# Patient Record
Sex: FEMALE | URBAN - METROPOLITAN AREA
[De-identification: names, ages, dates, MRNs, and addresses within clinical notes are randomized per-mention and may not be internally consistent; named-entity substitution may affect disease eponyms.]

---

## 2024-10-03 ENCOUNTER — OFFICE VISIT (OUTPATIENT)
Dept: URGENT CARE | Age: 34
End: 2024-10-03
Payer: MEDICAID

## 2024-10-03 VITALS
WEIGHT: 145 LBS | BODY MASS INDEX: 24.16 KG/M2 | HEART RATE: 75 BPM | HEIGHT: 65 IN | DIASTOLIC BLOOD PRESSURE: 79 MMHG | TEMPERATURE: 97.8 F | OXYGEN SATURATION: 96 % | RESPIRATION RATE: 16 BRPM | SYSTOLIC BLOOD PRESSURE: 109 MMHG

## 2024-10-03 DIAGNOSIS — H69.93 EUSTACHIAN TUBE DYSFUNCTION, BILATERAL: Primary | ICD-10-CM

## 2024-10-03 RX ORDER — CETIRIZINE HYDROCHLORIDE 10 MG/1
10 TABLET ORAL DAILY
COMMUNITY

## 2024-10-03 NOTE — PATIENT INSTRUCTIONS
Increase fluids and rest  Decongestants and nasal spray as discussed  Motrin or Tylenol as needed for pain or fever  Gargle with lightly salted warm water several times a day  Referral placed to ENT

## 2024-10-03 NOTE — PROGRESS NOTES
"Subjective   Patient ID: Jeannie Gaffney is a 34 y.o. female. They present today with a chief complaint of Earache (Left ear, on and off for 1 month).    History of Present Illness  HPI  1 to 2 months of intermittent left-sided ear pain. Mild nasal congestion with postnasal drip. No fevers or chills. No eye redness, discharge or itching.  No blood or discharge noted from ear.  No ear tenderness.  No nausea vomiting or diarrhea. No chest pain, shortness of breath or wheezing noted. No rashes or skin lesions noted. No known exposures to Mono, strep, pneumonia or influenza. Over-the-counter medications taken for symptoms. Nonsmoker.  Patient was seen at another facility where they recommended guaifenesin which she states has not helped.    Past Medical History  Allergies as of 10/03/2024 - Reviewed 10/03/2024   Allergen Reaction Noted    Penicillins Unknown 10/03/2024       (Not in a hospital admission)       No past medical history on file.    No past surgical history on file.     reports that she has never smoked. She has never used smokeless tobacco.    Review of Systems  Review of Systems as in history of present illness                               Objective    Vitals:    10/03/24 1008   BP: 109/79   BP Location: Left arm   Pulse: 75   Resp: 16   Temp: 36.6 °C (97.8 °F)   SpO2: 96%   Weight: 65.8 kg (145 lb)   Height: 1.651 m (5' 5\")     No LMP recorded.    Physical Exam  Gen- A&O. NAD at rest.   Ears- canals and TM's appear unremarkable with no signs of effusion, erythema or swelling  OP-no erythema or exudates. Some mucous in posterior OP   Neck-no anterior lymphadenopathy  Lungs- clear to auscultation without wheezes or rhonchi  Skin-no rashes, hives or lesions  Procedures    Point of Care Test & Imaging Results from this visit  No results found for this visit on 10/03/24.   No results found.    Diagnostic study results (if any) were reviewed by Suraj Hall MD.    Assessment/Plan   Allergies, medications, " history, and pertinent labs/EKGs/Imaging reviewed by Suraj Hall MD.     Medical Decision Making  At time of discharge patient was clinically well-appearing and HDS for outpatient management. The patient and/or family was educated regarding diagnosis, supportive care, OTC and Rx medications. The patient and/or family was given the opportunity to ask questions prior to discharge.  They verbalized understanding of my discussion of the plans for treatment, expected course, indications to return to  or seek further evaluation in ED, and the need for timely follow up as directed.   They were provided with a work/school excuse if requested.    Orders and Diagnoses  Diagnoses and all orders for this visit:  Eustachian tube dysfunction, bilateral      Medical Admin Record      Patient disposition: Home    Electronically signed by Suraj Hall MD  10:23 AM

## 2024-11-11 NOTE — PROGRESS NOTES
Patient ID: Jeannie Gaffney is a 34 y.o. female who presents for the evaluation of bilateral ear fullness, left otalgia, and nasal obstruction. They present as a referral from urgent care provider Dr. Suraj Hall.      PROVIDER IMPRESSIONS:  DIAGNOSES/PROBLEMS:  -Bilateral ear fullness  -TMJ dysfunction/syndrome  -Allergic rhinitis    ASSESSMENT:   Jeannie Gaffney is a pleasant 34 y.o. female who presents with symptoms of bilateral aural fullness (left>right), intermittent left otalgia, rhinorrhea and nasal obstruction. Based on the clinical information provided, symptoms and clinical exam findings are consistent with allergic rhinitis and TMJ dysfunction/syndrome. Reassurance provided to patient that exam today showed no evidence of acute infection or inflammation in the EAC bilaterally and that TM appears with no evidence of infection, effusion, or perforation bilaterally. Anterior rhinoscopy revealed bilateral ITH with pale/boggy appearance to the nasal mucosa and septum anteriorly deviated to the left.  Audiogram reviewed in detail with the patient, which revealed essentially normal hearing and middle ear function results in bilateral ears. I explained to patient that there is no evidence of negative middle ear pressure on tympanogram results and no sign of TM retraction otologic exam today.    PLAN:  I recommended patient initiates use of Flonase Sensimist for allergic rhinitis. I recommended either 2 puffs into each nostril daily, or 1 puff into each nostril twice daily for a consistent trial of at least 4-6 weeks. Patient counseled that this medication is a topical intranasal steroid nasal spray indicated to reduce nasal inflammation. Patient was educated on proper administration of nasal spray, by tilting their head down and pointing the applicator tip towards the lateral wall of the nose/corner of the eye on the same side. I advised patient to avoid pointing applicator toward the septum due to the risk of nasal  bleeding.  Patient informed to discontinue the spray if they experience adverse side effects. This medication may be purchased over the counter; a prescription may was to the patient's pharmacy upon request.  I recommend the patient starts to perform intranasal saline irrigations, either once daily or every other day, for allergic rhinitis. I educated the patient that saline irrigations aid in flushing out residual mucus, crusts, allergens or other environmental irritants in the nasal cavity. I emphasized that this will help clean the nasal cavity PRIOR to use of the medicated intranasal spray. I counseled the patient on appropriate administration of saline irrigations. I informed the patient that saline irrigation kits may be purchased over the counter.   I recommended implementing the following lifestyle strategies for TMJ symptom management: Initiate a soft diet for the next 2-3 weeks and avoid eating chewy/tough foods such as gum, raw fruits/vegetables, tough meats, or anything else that requires significant mastication. Examples of appropriate soft foods include mashed potatoes, soft pasta, macaroni, yogurt, ice cream, and other things may easily be mashed with a fork. Perform temple and cheekbone massages twice daily for several minutes by using your knuckles to gently massage in circles near temples and along your cheekbones as tolerated. Apply a warm/cold compress along the entire side of the affected face, directly over TMJ structures, once or twice daily for 20 minutes at a time and remove sooner if skin irritation occurs. Consider purchase of a custom nighttime mouth guard from a dentist to prevent jaw clenching and/or teeth grinding while asleep. If facial pain is present, may use o.t.c.  ibuprofen 600 mg three times a day for three days only with meals, advised to monitor for side effects of upset stomach and ulcers if ibuprofen is taken on an empty stomach and advised to avoid NSAIDs if previously  "deemed as contraindicated. Patient advised to refrain from habitual wide jaw-opening maneuver in attempt to pop the ears as this may exacerbate TMD.  Follow-up: Patient may schedule for follow-up in 3 months to evaluate treatment outcomes. They may follow-up sooner, if needed. May consider nasal endoscopy exam vs. Referral to rhinology for ongoing nasal symptoms. Patient is agreeable to this plan, all questions were answered to patient's satisfaction.     Subjective   HPI: Jeannie Gaffney is a 34 y.o. female who is referred by urgent care and presents for the evaluation of bilateral ear fullness, left greater than right.  The patient states that symptom onset began approximately 6 months ago and has continuously waxed and waned ever since. She describes ear fullness as feeling \"like her ears are balloons\" filled with fluid. Mentions she habitually opens the jaw widely in attempt to pop the ears. She reports that for the past 1 month she has noticed a dull/aching left ear pain which she rates a 1-2/10 on numeric pain scale. When asked about the presence of hearing loss, tinnitus, ear itching, ear drainage, autophony, dizziness or vertigo, she admits to none. Mentions that she also has had persistent nasal airflow obstruction and feels like she is \"breathing through coffee straws\" when breathing through the nostrils. She also notices persistent rhinorrhea with clear/runny nasal mucus dripping from the nose. She denies any current use of nasal sprays as she says that the taste makes her gag. Reports that she currently takes p.o. zyrtec twice daily. Has previously tried o.t.c. oral mucinex for symptoms with no benefit and does not tolerate the drying effects. When asked about pertinent otologic history, the patient denies a history of recurrent ear infections, denies history of ear surgery, denies history of PE tube insertion, and denies history of prolonged/traumatic loud noise exposure. The patient denies history of " wearing hearing aid devices. The patient does not endorse a family history of hearing loss.  Mentions a longstanding history of habitual bruxism.       PATIENT HISTORY:  No past medical history on file.   No past surgical history on file.   Allergies   Allergen Reactions    Penicillins Unknown        Current Outpatient Medications:     cetirizine (ZyrTEC) 10 mg tablet, Take 10 mg by mouth once daily., Disp: , Rfl:    Tobacco Use: Low Risk  (10/3/2024)    Patient History     Smoking Tobacco Use: Never     Smokeless Tobacco Use: Never     Passive Exposure: Not on file      Alcohol Use: Not on file      Social History     Substance and Sexual Activity   Drug Use Not on file        Review of Systems   All other systems negative.     Objective   Visit Vitals  Smoking Status Never        PHYSICAL EXAM:  General appearance: Appears well, well-nourished, well groomed. No acute distress.   Constitutional: No fever, chills, weight loss or weight gain.  Communication: Normal communication  Psychiatric: Oriented to person, place and time. Normal mood and affect.  Neurologic: Cranial nerves II-XII grossly intact and symmetric bilaterally.  Cardiovascular: Examination of peripheral vascular system shows no clubbing or cyanosis.  Respiratory: No respiratory distress increased work of breathing. Inspection of the chest with symmetric chest expansion and normal respiratory effort.  Skin: No head and neck rashes.  Head: Normocephalic. Atraumatic with no masses, lesions or scarring.  Face: Normal symmetry. No scars or deformities.  Eyes: Conjunctiva not edematous or erythematous. PERRLA  Neck: Supple and symmetric, trachea midline. Lymph nodes with no adenopathy.  Head: Normocephalic. Atraumatic with no masses, lesions or scarring.  Eyes: PERRL, EOMI, Conjunctiva is clear. No nystagmus.  Nose: External inspection of nose: No nasal lesions, lacerations or scars. No tenderness on frontal or maxillary sinus palpation. Anterior rhinoscopy  with limited visualization past the inferior turbinates: Septum is deviated to the left.  No septal perforation or lesions. No septal hematoma/ seroma.  No signs of bleeding.  No evidence of intranasal polyps.  Inferior turbinates are hypertrophied with pale/boggy appearance of the nasal mucosa.    Throat:  Floor of mouth is clear, no masses.  Tongue appears normal, no lesions or masses. Gums, gingiva, buccal mucosa appear pink and moist, no lesions. Teeth are in intact.  No obvious dental infections.  Peritonsillar regions appear symmetric without swelling.  Hard and soft palate appear normal, no obvious cleft. Uvula is midline.  Left Tonsil -- 2+, no exudates.  Right Tonsil -- 2+, no exudates.  Oropharynx: No lesions. Retropharyngeal wall is flat.  []postnasal drip.  Salivary Glands: Symmetric bilaterally.  No palpable masses.  No evidence of acute infection or salivary stones.  TMJ: Normal, no trismus/crepitus.  Right Ear: External inspection of ear with no deformity, scars, or masses. Mastoid is nontender. External auditory canal is clear.  TM is intact with no sign of infection, effusion, or retraction.  No perforation seen. Auto insufflation is visible under microscopy.  Left Ear: External inspection of ear with no deformity, scars, or masses. Mastoid is nontender. External auditory canal is clear.  TM is intact with no sign of infection, effusion, or retraction.  No perforation seen. Auto insufflation is visible under microscopy.    RESULTS:  I personally reviewed the patient's audiogram from 11/12/24, which revealed the following results: Normal hearing across all frequencies in bilateral ears. Normal tympanogram bilaterally. Excellent speech discrimination scores bilaterally. Acoustic reflexes present right ipsilateral, and present left ipsilateral.     Luisana Díaz, APRN-CNP

## 2024-11-12 ENCOUNTER — APPOINTMENT (OUTPATIENT)
Dept: OTOLARYNGOLOGY | Facility: CLINIC | Age: 34
End: 2024-11-12
Payer: MEDICAID

## 2024-11-12 ENCOUNTER — CLINICAL SUPPORT (OUTPATIENT)
Dept: AUDIOLOGY | Facility: CLINIC | Age: 34
End: 2024-11-12
Payer: MEDICAID

## 2024-11-12 VITALS
DIASTOLIC BLOOD PRESSURE: 74 MMHG | WEIGHT: 154 LBS | HEART RATE: 82 BPM | SYSTOLIC BLOOD PRESSURE: 110 MMHG | HEIGHT: 65 IN | BODY MASS INDEX: 25.66 KG/M2 | OXYGEN SATURATION: 97 %

## 2024-11-12 DIAGNOSIS — H93.8X3 EAR FULLNESS, BILATERAL: ICD-10-CM

## 2024-11-12 DIAGNOSIS — H92.03 OTALGIA, BILATERAL: Primary | ICD-10-CM

## 2024-11-12 DIAGNOSIS — M26.609 TMJ DYSFUNCTION: ICD-10-CM

## 2024-11-12 DIAGNOSIS — H93.8X3 CLOGGED EAR, BILATERAL: ICD-10-CM

## 2024-11-12 DIAGNOSIS — J30.89 ALLERGIC RHINITIS DUE TO OTHER ALLERGIC TRIGGER, UNSPECIFIED SEASONALITY: Primary | ICD-10-CM

## 2024-11-12 PROCEDURE — 1036F TOBACCO NON-USER: CPT

## 2024-11-12 PROCEDURE — 92504 EAR MICROSCOPY EXAMINATION: CPT

## 2024-11-12 PROCEDURE — 92550 TYMPANOMETRY & REFLEX THRESH: CPT | Performed by: AUDIOLOGIST

## 2024-11-12 PROCEDURE — 99203 OFFICE O/P NEW LOW 30 MIN: CPT

## 2024-11-12 PROCEDURE — 3008F BODY MASS INDEX DOCD: CPT

## 2024-11-12 PROCEDURE — 92557 COMPREHENSIVE HEARING TEST: CPT | Performed by: AUDIOLOGIST

## 2024-11-12 RX ORDER — VALACYCLOVIR HYDROCHLORIDE 1 G/1
1 TABLET, FILM COATED ORAL
COMMUNITY
Start: 2024-08-12

## 2024-11-12 RX ORDER — NORGESTIMATE AND ETHINYL ESTRADIOL 0.25-0.035
1 KIT ORAL
COMMUNITY
Start: 2024-10-05

## 2024-11-12 RX ORDER — FLUTICASONE FUROATE 27.5 UG/1
2 SPRAY, METERED NASAL
Qty: 10 G | Refills: 11 | Status: SHIPPED | OUTPATIENT
Start: 2024-11-12 | End: 2025-11-12

## 2024-11-12 ASSESSMENT — ENCOUNTER SYMPTOMS
DEPRESSION: 0
OCCASIONAL FEELINGS OF UNSTEADINESS: 1
OCCASIONAL FEELINGS OF UNSTEADINESS: 0

## 2024-11-12 ASSESSMENT — PAIN SCALES - GENERAL: PAINLEVEL_OUTOF10: 2

## 2024-11-12 NOTE — PROGRESS NOTES
AUDIOLOGY ADULT AUDIOMETRIC EVALUATION      Name:  Jeannie Gaffney  :  1990  Age:  34 y.o.  Date of Evaluation: 24    History:  Reason for visit:  Ms. Jeannie Gaffney was seen today as part of the visit with CAROLYN Castro for an evaluation of hearing.   Chief Complaint   Patient presents with    Earache    Ear Pressure    Hearing Loss    Dizziness     Stated for approximately the past six months she has been experiencing sensations of bilateral otalgia, ear pressure/congestion and fullness. Stated she feels like the ears are plugged and there is a fluid sensation inside the ears. Stated she has a current pain in each ear of 2/10 with some ear fullness. Reported a sensation of feeling like she has an ear infection, however, mentioned she has been told she does not have an ear infection.   She has been able to hear and communicate, however, stated she feels like she is hearing underwater and the hearing has been muffled. She has completed various allergy medications and decongestants, however, she has not experienced any relief from her symptoms.   Stated she has noticed some bilateral ear clicking and popping with some occasional sensations of drainage from the ears.   She reported a general sensation of feeling dizzy and imbalanced. Stated she has noticed some bilateral non-pulsatile non-bothersome ringing tinnitus.   Mentioned her sinus have also felt congested.   Denied any ear surgery, recent ear/sinus infections, recent falls, significant noise exposure, or sudden hearing loss.    EVALUATION     See Audiogram    RESULTS:    Otoscopic Evaluation:   Right Ear: Otoscopy revealed a clear healthy canal and a healthy tympanic membrane was visualized.   Left Ear: Otoscopy revealed a clear healthy canal and a healthy tympanic membrane was visualized.     Immittance:  Immittance Measures: 226 Hz   Right Ear: Tympanometric testing revealed a normal type A tympanogram with normal middle ear  pressure and normal static compliance.  Left Ear: Tympanometric testing revealed a normal type A tympanogram with normal middle ear pressure and normal static compliance.    Right: Ipsilateral acoustic reflexes were present at, 500-4,000 Hz, at expected sensation levels.  Left Ear: Ipsilateral acoustic reflexes were present at, 500-4,000 Hz, at expected sensation levels.     Test technique:  Pure Tone Audiometry via insert earphones    Reliability:   excellent    Pure Tone Audiometry:    Right Ear: Audiometric testing indicated near normal peripheral hearing sensitivity through 4,000 Hz, with a mild hearing loss at 8,000 Hz.   Left Ear:   Audiometric testing indicated normal to near normal peripheral hearing sensitivity through 500 Hz, with a mild sensorineural hearing loss at 1,000 Hz, sloping to normal peripheral hearing sensitivity through 6,000 Hz, sloping to a mild hearing loss at 8,000 Hz.      Speech Audiometry:   Right Ear:  Speech Reception Threshold (SRT) was obtained at 20 dBHL                  Word Recognition scores were excellent (100%) in quiet when words were presented at 60 dBHL  Left Ear:  Speech Reception Threshold (SRT) was obtained at  20 dBHL                  Word Recognition scores were excellent (100%) in quiet when words were presented at 60 dBHL  Testing was performed with recorded NU-6 speech words in quiet. Speech thresholds were in good agreement with the pure tone averages in each ear.     IMPRESSIONS:  Today's test results are hearing loss requiring medical/otologic follow-up.  The patient was counseled with regard to the findings.    RECOMMENDATIONS:  * Continue medical follow up with CAROLYN Castro.  * Retest as medically indicated, or sooner if a change in hearing sensitivity or tinnitus is noticed.   * Wear hearing protection while in the presence of loud sounds.   * Use tinnitus coping strategies as needed, such as sound apps on a smart phone, utilizing calming noise  in the room, running a fan at night, etc.   * Use effective communication strategies such as asking the speaker to gain attention prior to speaking, speaking in the same room, repeating words that were heard, etc.    PATIENT EDUCATION:   Discussed results and recommendations with the patient and a copy of the hearing test was provided.  Questions were addressed and the patient was encouraged to contact our department should concerns arise.  The patient was seen from  1:00-1:30 pm.

## 2025-01-31 ENCOUNTER — OFFICE VISIT (OUTPATIENT)
Dept: URGENT CARE | Age: 35
End: 2025-01-31
Payer: MEDICAID

## 2025-01-31 VITALS
DIASTOLIC BLOOD PRESSURE: 80 MMHG | HEART RATE: 79 BPM | OXYGEN SATURATION: 99 % | TEMPERATURE: 97.8 F | HEIGHT: 65 IN | BODY MASS INDEX: 24.16 KG/M2 | RESPIRATION RATE: 18 BRPM | WEIGHT: 145 LBS | SYSTOLIC BLOOD PRESSURE: 116 MMHG

## 2025-01-31 DIAGNOSIS — J06.9 VIRAL URI: Primary | ICD-10-CM

## 2025-01-31 DIAGNOSIS — R06.2 WHEEZING: ICD-10-CM

## 2025-01-31 DIAGNOSIS — H66.92 LEFT OTITIS MEDIA, UNSPECIFIED OTITIS MEDIA TYPE: ICD-10-CM

## 2025-01-31 PROCEDURE — 3008F BODY MASS INDEX DOCD: CPT | Performed by: FAMILY MEDICINE

## 2025-01-31 PROCEDURE — 99213 OFFICE O/P EST LOW 20 MIN: CPT | Performed by: FAMILY MEDICINE

## 2025-01-31 PROCEDURE — 1036F TOBACCO NON-USER: CPT | Performed by: FAMILY MEDICINE

## 2025-01-31 RX ORDER — AZITHROMYCIN 250 MG/1
TABLET, FILM COATED ORAL
Qty: 6 TABLET | Refills: 0 | Status: SHIPPED | OUTPATIENT
Start: 2025-01-31 | End: 2025-02-05

## 2025-01-31 RX ORDER — ALBUTEROL SULFATE 90 UG/1
2 INHALANT RESPIRATORY (INHALATION) EVERY 6 HOURS PRN
Qty: 18 G | Refills: 0 | Status: SHIPPED | OUTPATIENT
Start: 2025-01-31 | End: 2026-01-31

## 2025-01-31 ASSESSMENT — PAIN SCALES - GENERAL: PAINLEVEL_OUTOF10: 5

## 2025-01-31 NOTE — PATIENT INSTRUCTIONS
Give medication as directed with food until completed   Tylenol or Motrin as needed for pain and fever   encourage fluids and rest   follow-up if symptoms worsen   see PCP in 5 to 7 days if no improvement  Use inhaler up to 4 times a day for wheezing  Continue Mucinex as before     Abdomen soft, non-tender

## 2025-01-31 NOTE — PROGRESS NOTES
"Subjective   Patient ID: Jeannie Gaffney is a 34 y.o. female. They present today with a chief complaint of Earache (2 days/Left ear pain).    History of Present Illness  HPI  2 days of left ear pain. Mild nasal congestion occasional wheezing with postnasal drip. No fevers or chills. No eye redness, discharge or itching.  No blood or discharge noted from ear.  No ear tenderness.  No nausea vomiting or diarrhea. No chest pain, shortness of breath or wheezing noted. No rashes or skin lesions noted. No known exposures to Mono, strep, pneumonia or influenza. Over-the-counter medications taken for symptoms.     Past Medical History  Allergies as of 01/31/2025 - Reviewed 01/31/2025   Allergen Reaction Noted    Penicillins Unknown 10/03/2024       (Not in a hospital admission)       No past medical history on file.    No past surgical history on file.     reports that she has never smoked. She has never used smokeless tobacco. She reports that she does not drink alcohol and does not use drugs.    Review of Systems  Review of Systems       As in history of present illness                        Objective    Vitals:    01/31/25 0909   BP: 116/80   BP Location: Right arm   Patient Position: Sitting   BP Cuff Size: Adult   Pulse: 79   Resp: 18   Temp: 36.6 °C (97.8 °F)   TempSrc: Oral   SpO2: 99%   Weight: 65.8 kg (145 lb)   Height: 1.651 m (5' 5\")     Patient's last menstrual period was 01/07/2025 (approximate).    Physical Exam  Gen- A&O. NAD at rest.   Ears- canals and TM's appear   OP-no erythema or exudates. Some mucous in posterior OP   Neck- mild anterior lymphadenopathy  Lungs- clear to auscultation except some scattered end expiratory wheezes.  No rhonchi  Skin-no rashes, hives or lesions  Procedures    Point of Care Test & Imaging Results from this visit  No results found for this visit on 01/31/25.   No results found.    Diagnostic study results (if any) were reviewed by Suraj Hall MD.    Assessment/Plan "   Allergies, medications, history, and pertinent labs/EKGs/Imaging reviewed by Suraj Hall MD.     Medical Decision Making  At time of discharge patient was clinically well-appearing and HDS for outpatient management. The patient and/or family was educated regarding diagnosis, supportive care, OTC and Rx medications. The patient and/or family was given the opportunity to ask questions prior to discharge.  They verbalized understanding of my discussion of the plans for treatment, expected course, indications to return to  or seek further evaluation in ED, and the need for timely follow up as directed.   They were provided with a work/school excuse if requested.    Orders and Diagnoses  Diagnoses and all orders for this visit:  Viral URI  Left otitis media, unspecified otitis media type  -     azithromycin (Zithromax) 250 mg tablet; Take 2 tabs (500 mg) by mouth today, than 1 daily for 4 days.  Wheezing  -     albuterol 90 mcg/actuation inhaler; Inhale 2 puffs every 6 hours if needed for wheezing.      Medical Admin Record      Patient disposition: Home    Electronically signed by Surja Hall MD  9:24 AM

## 2025-02-10 ENCOUNTER — APPOINTMENT (OUTPATIENT)
Dept: OTOLARYNGOLOGY | Facility: CLINIC | Age: 35
End: 2025-02-10
Payer: MEDICAID

## 2025-02-17 ENCOUNTER — APPOINTMENT (OUTPATIENT)
Facility: CLINIC | Age: 35
End: 2025-02-17
Payer: MEDICAID

## 2025-02-25 ENCOUNTER — HOSPITAL ENCOUNTER (EMERGENCY)
Dept: CARDIOLOGY | Facility: HOSPITAL | Age: 35
Discharge: HOME | End: 2025-02-25
Payer: MEDICAID

## 2025-02-25 ENCOUNTER — HOSPITAL ENCOUNTER (EMERGENCY)
Facility: HOSPITAL | Age: 35
Discharge: HOME | End: 2025-02-25
Payer: MEDICAID

## 2025-02-25 ENCOUNTER — APPOINTMENT (OUTPATIENT)
Dept: RADIOLOGY | Facility: HOSPITAL | Age: 35
End: 2025-02-25
Payer: MEDICAID

## 2025-02-25 VITALS
DIASTOLIC BLOOD PRESSURE: 71 MMHG | SYSTOLIC BLOOD PRESSURE: 105 MMHG | HEART RATE: 62 BPM | RESPIRATION RATE: 18 BRPM | BODY MASS INDEX: 24.99 KG/M2 | HEIGHT: 65 IN | TEMPERATURE: 98.4 F | WEIGHT: 150 LBS | OXYGEN SATURATION: 98 %

## 2025-02-25 DIAGNOSIS — G43.809 OTHER MIGRAINE WITHOUT STATUS MIGRAINOSUS, NOT INTRACTABLE: ICD-10-CM

## 2025-02-25 DIAGNOSIS — H66.91 RIGHT OTITIS MEDIA, UNSPECIFIED OTITIS MEDIA TYPE: Primary | ICD-10-CM

## 2025-02-25 LAB
ALBUMIN SERPL BCP-MCNC: 4.3 G/DL (ref 3.4–5)
ALP SERPL-CCNC: 50 U/L (ref 33–110)
ALT SERPL W P-5'-P-CCNC: 7 U/L (ref 7–45)
ANION GAP SERPL CALC-SCNC: 12 MMOL/L (ref 10–20)
AST SERPL W P-5'-P-CCNC: 10 U/L (ref 9–39)
B-HCG SERPL-ACNC: <2 MIU/ML
BASOPHILS # BLD AUTO: 0.02 X10*3/UL (ref 0–0.1)
BASOPHILS NFR BLD AUTO: 0.2 %
BILIRUB SERPL-MCNC: 0.3 MG/DL (ref 0–1.2)
BUN SERPL-MCNC: 14 MG/DL (ref 6–23)
CALCIUM SERPL-MCNC: 9.5 MG/DL (ref 8.6–10.3)
CHLORIDE SERPL-SCNC: 104 MMOL/L (ref 98–107)
CO2 SERPL-SCNC: 27 MMOL/L (ref 21–32)
CREAT SERPL-MCNC: 0.68 MG/DL (ref 0.5–1.05)
EGFRCR SERPLBLD CKD-EPI 2021: >90 ML/MIN/1.73M*2
EOSINOPHIL # BLD AUTO: 0 X10*3/UL (ref 0–0.7)
EOSINOPHIL NFR BLD AUTO: 0 %
ERYTHROCYTE [DISTWIDTH] IN BLOOD BY AUTOMATED COUNT: 13 % (ref 11.5–14.5)
GLUCOSE SERPL-MCNC: 103 MG/DL (ref 74–99)
HCT VFR BLD AUTO: 39.8 % (ref 36–46)
HGB BLD-MCNC: 13.1 G/DL (ref 12–16)
IMM GRANULOCYTES # BLD AUTO: 0.03 X10*3/UL (ref 0–0.7)
IMM GRANULOCYTES NFR BLD AUTO: 0.3 % (ref 0–0.9)
LYMPHOCYTES # BLD AUTO: 2.05 X10*3/UL (ref 1.2–4.8)
LYMPHOCYTES NFR BLD AUTO: 21.7 %
MCH RBC QN AUTO: 27.7 PG (ref 26–34)
MCHC RBC AUTO-ENTMCNC: 32.9 G/DL (ref 32–36)
MCV RBC AUTO: 84 FL (ref 80–100)
MONOCYTES # BLD AUTO: 0.94 X10*3/UL (ref 0.1–1)
MONOCYTES NFR BLD AUTO: 9.9 %
NEUTROPHILS # BLD AUTO: 6.42 X10*3/UL (ref 1.2–7.7)
NEUTROPHILS NFR BLD AUTO: 67.9 %
NRBC BLD-RTO: 0 /100 WBCS (ref 0–0)
PLATELET # BLD AUTO: 227 X10*3/UL (ref 150–450)
POTASSIUM SERPL-SCNC: 3.5 MMOL/L (ref 3.5–5.3)
PROT SERPL-MCNC: 7.4 G/DL (ref 6.4–8.2)
RBC # BLD AUTO: 4.73 X10*6/UL (ref 4–5.2)
SODIUM SERPL-SCNC: 139 MMOL/L (ref 136–145)
WBC # BLD AUTO: 9.5 X10*3/UL (ref 4.4–11.3)

## 2025-02-25 PROCEDURE — 70450 CT HEAD/BRAIN W/O DYE: CPT

## 2025-02-25 PROCEDURE — 2500000004 HC RX 250 GENERAL PHARMACY W/ HCPCS (ALT 636 FOR OP/ED)

## 2025-02-25 PROCEDURE — 96365 THER/PROPH/DIAG IV INF INIT: CPT

## 2025-02-25 PROCEDURE — 84702 CHORIONIC GONADOTROPIN TEST: CPT

## 2025-02-25 PROCEDURE — 85025 COMPLETE CBC W/AUTO DIFF WBC: CPT

## 2025-02-25 PROCEDURE — 96375 TX/PRO/DX INJ NEW DRUG ADDON: CPT

## 2025-02-25 PROCEDURE — 36415 COLL VENOUS BLD VENIPUNCTURE: CPT

## 2025-02-25 PROCEDURE — 70450 CT HEAD/BRAIN W/O DYE: CPT | Performed by: INTERNAL MEDICINE

## 2025-02-25 PROCEDURE — 84075 ASSAY ALKALINE PHOSPHATASE: CPT

## 2025-02-25 PROCEDURE — 93005 ELECTROCARDIOGRAM TRACING: CPT

## 2025-02-25 PROCEDURE — 99285 EMERGENCY DEPT VISIT HI MDM: CPT | Mod: 25

## 2025-02-25 PROCEDURE — 80053 COMPREHEN METABOLIC PANEL: CPT

## 2025-02-25 RX ORDER — ONDANSETRON 4 MG/1
4 TABLET, ORALLY DISINTEGRATING ORAL EVERY 8 HOURS PRN
Qty: 20 TABLET | Refills: 0 | Status: SHIPPED | OUTPATIENT
Start: 2025-02-25 | End: 2025-03-04

## 2025-02-25 RX ORDER — DOXYCYCLINE 100 MG/1
100 CAPSULE ORAL 2 TIMES DAILY
Qty: 14 CAPSULE | Refills: 0 | Status: SHIPPED | OUTPATIENT
Start: 2025-02-25 | End: 2025-03-04

## 2025-02-25 RX ORDER — METOCLOPRAMIDE HYDROCHLORIDE 5 MG/ML
10 INJECTION INTRAMUSCULAR; INTRAVENOUS ONCE
Status: COMPLETED | OUTPATIENT
Start: 2025-02-25 | End: 2025-02-25

## 2025-02-25 RX ORDER — DIPHENHYDRAMINE HYDROCHLORIDE 50 MG/ML
25 INJECTION INTRAMUSCULAR; INTRAVENOUS ONCE
Status: COMPLETED | OUTPATIENT
Start: 2025-02-25 | End: 2025-02-25

## 2025-02-25 RX ORDER — BUTALBITAL, ACETAMINOPHEN AND CAFFEINE 50; 325; 40 MG/1; MG/1; MG/1
1 TABLET ORAL EVERY 6 HOURS PRN
Qty: 20 TABLET | Refills: 0 | Status: SHIPPED | OUTPATIENT
Start: 2025-02-25 | End: 2025-03-02

## 2025-02-25 RX ORDER — MAGNESIUM SULFATE 1 G/100ML
1 INJECTION INTRAVENOUS ONCE
Status: COMPLETED | OUTPATIENT
Start: 2025-02-25 | End: 2025-02-25

## 2025-02-25 RX ORDER — KETOROLAC TROMETHAMINE 30 MG/ML
30 INJECTION, SOLUTION INTRAMUSCULAR; INTRAVENOUS ONCE
Status: DISCONTINUED | OUTPATIENT
Start: 2025-02-25 | End: 2025-02-25 | Stop reason: HOSPADM

## 2025-02-25 RX ADMIN — DEXAMETHASONE SODIUM PHOSPHATE 4 MG: 4 INJECTION, SOLUTION INTRAMUSCULAR; INTRAVENOUS at 12:32

## 2025-02-25 RX ADMIN — METOCLOPRAMIDE HYDROCHLORIDE 10 MG: 5 INJECTION INTRAMUSCULAR; INTRAVENOUS at 12:49

## 2025-02-25 RX ADMIN — DIPHENHYDRAMINE HYDROCHLORIDE 25 MG: 50 INJECTION, SOLUTION INTRAMUSCULAR; INTRAVENOUS at 12:33

## 2025-02-25 RX ADMIN — MAGNESIUM SULFATE HEPTAHYDRATE 1 G: 1 INJECTION, SOLUTION INTRAVENOUS at 12:35

## 2025-02-25 ASSESSMENT — COLUMBIA-SUICIDE SEVERITY RATING SCALE - C-SSRS
2. HAVE YOU ACTUALLY HAD ANY THOUGHTS OF KILLING YOURSELF?: NO
6. HAVE YOU EVER DONE ANYTHING, STARTED TO DO ANYTHING, OR PREPARED TO DO ANYTHING TO END YOUR LIFE?: NO
1. IN THE PAST MONTH, HAVE YOU WISHED YOU WERE DEAD OR WISHED YOU COULD GO TO SLEEP AND NOT WAKE UP?: NO

## 2025-02-25 ASSESSMENT — LIFESTYLE VARIABLES
EVER HAD A DRINK FIRST THING IN THE MORNING TO STEADY YOUR NERVES TO GET RID OF A HANGOVER: NO
TOTAL SCORE: 0
EVER FELT BAD OR GUILTY ABOUT YOUR DRINKING: NO
HAVE YOU EVER FELT YOU SHOULD CUT DOWN ON YOUR DRINKING: NO
HAVE PEOPLE ANNOYED YOU BY CRITICIZING YOUR DRINKING: NO

## 2025-02-25 ASSESSMENT — PAIN SCALES - GENERAL: PAINLEVEL_OUTOF10: 6

## 2025-02-25 ASSESSMENT — PAIN - FUNCTIONAL ASSESSMENT: PAIN_FUNCTIONAL_ASSESSMENT: 0-10

## 2025-02-25 NOTE — Clinical Note
Jeannie Gaffney was seen and treated in our emergency department on 2/25/2025.  She may return to work on 02/28/2025.       If you have any questions or concerns, please don't hesitate to call.      Sonny William PA-C

## 2025-02-25 NOTE — ED PROVIDER NOTES
"HPI   Chief Complaint   Patient presents with    Earache     Earache with dizziness       History provided by: Patient    Limitations to history: None    CC: Headache    HPI: 34-year-old female previously healthy presents the emergency department to be evaluated for multiple medical complaints.  Patient states that 3 to 4 days ago she started not feeling well with lightheadedness.  She further characterizes this sensation as \"feeling woozy and the feeling that she is going to pass out \".  She denies room spinning dizziness sensation.  She is able to ambulate without difficulty.  Denies slurred speech or facial drooping.  Denies numbness tingling or weakness in the extremities.  Denies chest pain or shortness of breath.  She denies history of heart or lung disease including ACS, arrhythmias, heart failure, DVT or PE, asthma.  Patient was not having a headache when her symptoms originally started but she has been having a headache for the last few days.  She characterized the headache as \"aching \"and localizes it near her forehead.  She reports sensitivity to light and sound but denies nausea and vomiting.  Patient has had headaches like this in the past but has never been formally diagnosed with migraines.  Patient denies the headache being the worst headache she is ever had or being sudden in onset.  She states that her headache is similar to the headaches that she has had before.  She has been trying to take ibuprofen with not much relief of her headaches.  Denies any head injury or syncopal episode.  Denies use of anticoagulants or antiplatelets.  Denies vision changes.  Patient also states she is having bilateral earache.  Denies runny nose and congestion.  Denies any other flulike symptoms.  Denies neck pain.  Denies fever and chills.  Patient presented to [facility] emergency department on the 22nd for similar symptoms where she was diagnosed with otitis media and started on meclizine, prednisone, Z-Jn.  She " has not been feeling much better since her visit in the ER.    ROS: Negative unless mentioned in HPI    Medical Hx: Allergy to Augmentin and penicillin.  Immunizations are up-to-date.    Physical exam:    Constitutional: Patient is well-nourished and well-developed.  Patient does have sensitivity to light.  Oriented to person, place, time, and situation.    HEENT: Head is normocephalic, atraumatic. Patient's airway is patent.  Patient's right tympanic membrane is slightly erythematous.  No mastoid tenderness or abnormalities.  No external canal erythema or edema and no pain with manipulation of the pinna or tragus.  Nasal mucosa clear.  Mouth with normal mucosa.  Throat is not erythematous and there are no oropharyngeal exudates, uvula is midline.  No obvious facial deformities.  No nuchal rigidity or meningeal signs.    Eyes: Clear bilaterally.  Pupils are equal round and reactive to light and accommodation.  Extraocular movements intact.      Cardiac: Regular rate, regular rhythm.  Heart sounds S1, S2.  No murmurs, rubs, or gallops.  PMI nondisplaced.  No JVD.    Respiratory: Regular respiratory rate and effort.  Breath sounds are clear and equal bilaterally, no adventitious lung sounds.  Patient is speaking in full sentences and is in no apparent respiratory distress. No use of accessory muscles.      Gastrointestinal: Abdomen is soft, nondistended, and nontender.  There are no obvious deformities.  No rebound tenderness or guarding.  Bowel sounds are normal active.    Genitourinary: No CVA or flank tenderness.    Musculoskeletal: No reproducible tenderness.  No obvious skin or bony deformities.  Patient has equal range of motion in all extremities and no strength deficiencies.  No muscle or joint tenderness. No back or neck tenderness.  Capillary refill less than 3 seconds.  Strong peripheral pulses.  No sensory deficits.    Neurological: Patient is alert and oriented.  No focal deficits.  5/5 strength in all  extremities.  Cranial nerves II through XII intact. GCS15.  NIH 0.    Skin: Skin is normal color for race and is warm, dry, and intact.  No evidence of trauma.  No lesions, rashes, bruising, jaundice, or masses.    Psych: Appropriate mood and affect.  No apparent risk to self or others.    Heme/lymph: No adenopathy, lymphadenopathy, or splenomegaly    Physical exam is otherwise negative unless stated above or in history of present illness.              Patient History   No past medical history on file.  No past surgical history on file.  No family history on file.  Social History     Tobacco Use    Smoking status: Never    Smokeless tobacco: Never   Substance Use Topics    Alcohol use: Never    Drug use: Never       Physical Exam   ED Triage Vitals [02/25/25 1053]   Temperature Heart Rate Respirations BP   36.9 °C (98.4 °F) 94 18 124/72      Pulse Ox Temp Source Heart Rate Source Patient Position   98 % Temporal Monitor Sitting      BP Location FiO2 (%)     Right arm --       Physical Exam      ED Course & MDM   Diagnoses as of 02/25/25 1338   Right otitis media, unspecified otitis media type   Other migraine without status migrainosus, not intractable        Patient updated on plan for lab testing, IV insertion, radiology imaging, and medications to be administered while in the ER (if indicated). Patient updated on expected wait times for testing and results. Patient provided my name and told to ask any staff member for questions or concerns if they should arise. Electronic medical record reviewed.     MDM    Upon initial assessment, patient was healthy non-toxic appearing and in no apparent distress.     Patient presented to the emergency department with the chief complaint headache.Patient is alert and oriented.  No focal deficits.  5/5 strength in all extremities.  Cranial nerves II through XII intact. GCS15.  NIH 0. Head is normocephalic, atraumatic. Patient's airway is patent.  Patient's right tympanic  membrane is slightly erythematous.  No mastoid tenderness or abnormalities.  No external canal erythema or edema and no pain with manipulation of the pinna or tragus.  Nasal mucosa clear.  Mouth with normal mucosa.  Throat is not erythematous and there are no oropharyngeal exudates, uvula is midline.  No obvious facial deformities.  No nuchal rigidity or meningeal signs. On arrival to the emergency department, vital signs were within normal limits    Patient has no findings that would suggest subarachnoid hemorrhage, arterial abnormality, acute anterior posterior stroke, meningitis or encephalitis, giant cell arteriolitis, cavernous sinus thrombosis.  Will obtain basic blood work,  and pregnancy test.  Will give the patient IV normal saline and a migraine cocktail including Toradol, Reglan and Benadryl, Decadron and magnesium.  Will obtain a CT of the head for further evaluation.    Patient is feeling much better after the medications, states that her headache is almost completely resolved.  Patient CT reveals signs of sinusitis however she denies any clinical findings of just this including congestion or rhinorrhea.  Her blood work overall is unremarkable.  Patient feels well and feels comfortable going home.  I will transition the patient to oral doxycycline to better cover her for otitis media on the right side.  I do believe she is likely experiencing complex migraines, she will be given Zofran and Fioricet.  I will have her follow-up with neurology.  All questions and concerns addressed.  She no longer feels lightheaded after the fluids as well.  Reasons to return to ER discussed.  Patient verbalized understanding and agreement with the treatment plan and they remained hemodynamically stable in the ER.    This note was dictated using a speech recognition program.  While an attempt was made at proof-reading to minimize errors, minor errors in transcription may be present         No data recorded                                  Medical Decision Making      Procedure  Procedures     Sonny William PA-C  02/25/25 1330       Sonny William PA-C  02/25/25 5137

## 2025-02-28 LAB
ATRIAL RATE: 73 BPM
P AXIS: 25 DEGREES
P OFFSET: 200 MS
P ONSET: 152 MS
PR INTERVAL: 132 MS
Q ONSET: 218 MS
QRS COUNT: 12 BEATS
QRS DURATION: 88 MS
QT INTERVAL: 398 MS
QTC CALCULATION(BAZETT): 438 MS
QTC FREDERICIA: 425 MS
R AXIS: 67 DEGREES
T AXIS: 47 DEGREES
T OFFSET: 417 MS
VENTRICULAR RATE: 73 BPM

## 2025-03-05 ENCOUNTER — OFFICE VISIT (OUTPATIENT)
Dept: OTOLARYNGOLOGY | Facility: CLINIC | Age: 35
End: 2025-03-05
Payer: MEDICAID

## 2025-03-05 VITALS
HEART RATE: 89 BPM | DIASTOLIC BLOOD PRESSURE: 76 MMHG | WEIGHT: 150 LBS | BODY MASS INDEX: 24.99 KG/M2 | HEIGHT: 65 IN | SYSTOLIC BLOOD PRESSURE: 113 MMHG

## 2025-03-05 DIAGNOSIS — J32.2 CHRONIC ETHMOIDAL SINUSITIS: Primary | ICD-10-CM

## 2025-03-05 DIAGNOSIS — R51.9 HEADACHE DISORDER: ICD-10-CM

## 2025-03-05 DIAGNOSIS — H92.02 LEFT EAR PAIN: ICD-10-CM

## 2025-03-05 PROCEDURE — 3008F BODY MASS INDEX DOCD: CPT | Performed by: NURSE PRACTITIONER

## 2025-03-05 PROCEDURE — 99214 OFFICE O/P EST MOD 30 MIN: CPT | Performed by: NURSE PRACTITIONER

## 2025-03-05 RX ORDER — POTASSIUM CHLORIDE 750 MG/1
1 TABLET, FILM COATED, EXTENDED RELEASE ORAL DAILY
COMMUNITY

## 2025-03-05 NOTE — PROGRESS NOTES
Subjective   Patient ID: Jeannie Gaffney is a 34 y.o. female who presents for Recurrent Otitis and Recurrent Sinusitis.  HPI  Patient presents today as a referral from ED.  She was seen in the emergency department on 2/25/2025 for lightheadedness and headache.  After infusion of IV normal saline and migraine cocktail including Toradol, Reglan, Benadryl, Decadron  The patient significantly felt better and was discharged in good condition.  They did a CT of the head to rule out intracranial bleed or tumors.  The CT was essentially unremarkable except for a mild right-sided ethmoidal sinusitis. She is referred to Neurology for headaches ( Migraine )  from ED.   She states that she is here to review the results of the CT scan and what to do with the sinusitis.  She was previously seen by Anaya Díaz NP and was recommended to use saline irrigation, Flonase, oral antihistamine as well as TMJ management.  Patient states that she is not keen on using the nasal spray as she is sensitive to the smell and taste.  She has not used topical nasal steroids recently but has been very diligent in using the oral antihistamine and saline.  She states that the left ear seem to be plugged but no ear drainage from the canal.  It does ache but no complaints of tinnitus, dizziness or vertigo today.    CT HEAD  Interpreted By:  Summer Orr,   STUDY:  CT HEAD WO IV CONTRAST;  2/25/2025 1:11 pm      INDICATION:  Signs/Symptoms:LH.          COMPARISON:  None.      ACCESSION NUMBER(S):  [id number]      ORDERING CLINICIAN:  KARLOS BARRON      TECHNIQUE:  Noncontrast axial CT scan of head was performed. Angled reformats in  brain and bone windows were generated. The images were reviewed in  bone, brain, blood and soft tissue windows.      FINDINGS:  CSF Spaces: The ventricles, sulci and basal cisterns are within  normal limits. There is no abnormal extraaxial fluid collection.      Parenchyma:  The grey-white differentiation is intact.  There is no  mass effect or midline shift.  There is no intracranial hemorrhage.      Calvarium: The calvarium is unremarkable.      Paranasal sinuses and mastoids: Mild mucosal thickening of the  right-greater-than-left ethmoid air cells. Visualized paranasal  sinuses and mastoids are otherwise clear.      IMPRESSION:  1. No acute intracranial abnormality.  2. Mild mucosal thickening of the right-greater-than-left ethmoid air  cells, please correlate for sinusitis.    Review of Systems    All other systems have been reviewed and are negative for complaints except for those mentioned in history of present illness, past medical history and problem list       Objective   Physical Exam  CONSTITUTIONAL: No acute distress, normal facial features; No fever; no chills  VOICE: No hoarseness or other audible abnormality  RESPIRATION: Breathing comfortably, no stridor; normal breathing effort  CV: No cyanosis visible on the face and neck area  EYES:Pupils equal and round ; no erythema; conjunctiva clear; sclera white  NEURO: Alert and oriented, able to raise eyebrows symmetrical bilateral, smile with no facial droop, able to swallow  HEAD AND FACE: Symmetric facial features, no masses or lesions    Right ear examination: External ear normal.  EAC is clear.  TM is intact with no effusion, retraction or perforation.  Left ear examination: External ear normal.  EAC is clear.  TM intact with no effusion, or retraction or perforation.    NOSE: External nose midline; nasal turbinates normal no mucopus or polyps.  ORAL CAVITY: No lesions of external lips; uvula is midline; tongue with good mobility; no gross mass in oral cavity; mucosa appears pink   NECK/LYMPH: No obvious deformity or lesions; trachea is midline  PSYCH: Alert and oriented with appropriate mood and affect      Assessment/Plan     1. Chronic ethmoidal sinusitis        2. Left ear pain  Referral to ENT      3. Headache disorder              In summary Jeannie Gaffney is  a 34-year-old female with left ear pain likely secondary to recent otitis media but her exam today showed no evidence of middle ear fluid.  We discussed that after having otitis media that symptoms of aural fullness sometimes ear pain can last for about 3 months.  I recommended to start decongesting the eustachian tube by using cycle Afrin nasal spray along with topical nasal steroids.  We discussed that she does not need to use Flonase she can switch it to either Nasacort or Rhinocort or Xynase although the latter is nonmedicated and does not contain steroids.  Her ear pain could also be likely secondary to TMJ as she has had this diagnosis before and my recommendation also is TMJ supportive care.    I reviewed her CT scan which showed no evidence of opacification of frontal, ethmoid, maxillary and sphenoid sinuses she does have a very mild thickening of the tissue lining in the ethmoid air cells but not significant sinusitis.  I recommend to continue the above recommendation with saline, topical nasal steroid spray and oral antihistamine.    We discussed various ways to manage headache.  If this is truly migraine type we discussed various food triggers for migraine and to do her best to try to avoid consuming foods that contain caffeine, sulfites, high salt content.  I provided her with elimination diet foods today as well as targeted strategies for elimination diet.  I recommend to try for 4 weeks and then follow-up with me.  She will still continue her follow-up with neurology but my hope is that her migraines will be better after controlling diet, environmental and lifestyle factors.    I will see her back in clinic in 4 weeks.  All questions answered to patient satisfaction.             CAROLYN Quinteros 03/05/25 8:57 AM

## 2025-03-05 NOTE — LETTER
March 6, 2025     CAROLYN Rose  [address] [place] [address]    Patient: Jeannie Gaffney   YOB: 1990   Date of Visit: 3/5/2025       Dear CAROLYN Chow:    Thank you for referring Jeannie Gaffney to me for evaluation. Below are my notes for this consultation.  If you have questions, please do not hesitate to call me. I look forward to following your patient along with you.     I have provided her with a special elimination diet packet. I will see her back in my clinic in 4 weeks hopefully migraine is better and ENT concerns better.     Sincerely,     CAROLYN Quinteros      CC: No Recipients  ______________________________________________________________________________________    Subjective  Patient ID: Jeannie Gaffney is a 34 y.o. female who presents for Recurrent Otitis and Recurrent Sinusitis.  HPI  Patient presents today as a referral from ED.  She was seen in the emergency department on 2/25/2025 for lightheadedness and headache.  After infusion of IV normal saline and migraine cocktail including Toradol, Reglan, Benadryl, Decadron  The patient significantly felt better and was discharged in good condition.  They did a CT of the head to rule out intracranial bleed or tumors.  The CT was essentially unremarkable except for a mild right-sided ethmoidal sinusitis. She is referred to Neurology for headaches ( Migraine )  from ED.   She states that she is here to review the results of the CT scan and what to do with the sinusitis.  She was previously seen by Anaya Díaz NP and was recommended to use saline irrigation, Flonase, oral antihistamine as well as TMJ management.  Patient states that she is not keen on using the nasal spray as she is sensitive to the smell and taste.  She has not used topical nasal steroids recently but has been very diligent in using the oral antihistamine and saline.  She states that the left ear seem to be  plugged but no ear drainage from the canal.  It does ache but no complaints of tinnitus, dizziness or vertigo today.    CT HEAD  Interpreted By:  Summer Orr,   STUDY:  CT HEAD WO IV CONTRAST;  2/25/2025 1:11 pm      INDICATION:  Signs/Symptoms:LH.          COMPARISON:  None.      ACCESSION NUMBER(S):  [id number]      ORDERING CLINICIAN:  KARLOS BARRON      TECHNIQUE:  Noncontrast axial CT scan of head was performed. Angled reformats in  brain and bone windows were generated. The images were reviewed in  bone, brain, blood and soft tissue windows.      FINDINGS:  CSF Spaces: The ventricles, sulci and basal cisterns are within  normal limits. There is no abnormal extraaxial fluid collection.      Parenchyma:  The grey-white differentiation is intact. There is no  mass effect or midline shift.  There is no intracranial hemorrhage.      Calvarium: The calvarium is unremarkable.      Paranasal sinuses and mastoids: Mild mucosal thickening of the  right-greater-than-left ethmoid air cells. Visualized paranasal  sinuses and mastoids are otherwise clear.      IMPRESSION:  1. No acute intracranial abnormality.  2. Mild mucosal thickening of the right-greater-than-left ethmoid air  cells, please correlate for sinusitis.    Review of Systems    All other systems have been reviewed and are negative for complaints except for those mentioned in history of present illness, past medical history and problem list       Objective  Physical Exam  CONSTITUTIONAL: No acute distress, normal facial features; No fever; no chills  VOICE: No hoarseness or other audible abnormality  RESPIRATION: Breathing comfortably, no stridor; normal breathing effort  CV: No cyanosis visible on the face and neck area  EYES:Pupils equal and round ; no erythema; conjunctiva clear; sclera white  NEURO: Alert and oriented, able to raise eyebrows symmetrical bilateral, smile with no facial droop, able to swallow  HEAD AND FACE: Symmetric facial features, no  masses or lesions    Right ear examination: External ear normal.  EAC is clear.  TM is intact with no effusion, retraction or perforation.  Left ear examination: External ear normal.  EAC is clear.  TM intact with no effusion, or retraction or perforation.    NOSE: External nose midline; nasal turbinates normal no mucopus or polyps.  ORAL CAVITY: No lesions of external lips; uvula is midline; tongue with good mobility; no gross mass in oral cavity; mucosa appears pink   NECK/LYMPH: No obvious deformity or lesions; trachea is midline  PSYCH: Alert and oriented with appropriate mood and affect      Assessment/Plan    1. Chronic ethmoidal sinusitis        2. Left ear pain  Referral to ENT      3. Headache disorder              In summary Jeannie Gaffney is a 34-year-old female with left ear pain likely secondary to recent otitis media but her exam today showed no evidence of middle ear fluid.  We discussed that after having otitis media that symptoms of aural fullness sometimes ear pain can last for about 3 months.  I recommended to start decongesting the eustachian tube by using cycle Afrin nasal spray along with topical nasal steroids.  We discussed that she does not need to use Flonase she can switch it to either Nasacort or Rhinocort or Xynase although the latter is nonmedicated and does not contain steroids.  Her ear pain could also be likely secondary to TMJ as she has had this diagnosis before and my recommendation also is TMJ supportive care.    I reviewed her CT scan which showed no evidence of opacification of frontal, ethmoid, maxillary and sphenoid sinuses she does have a very mild thickening of the tissue lining in the ethmoid air cells but not significant sinusitis.  I recommend to continue the above recommendation with saline, topical nasal steroid spray and oral antihistamine.    We discussed various ways to manage headache.  If this is truly migraine type we discussed various food triggers for migraine  and to do her best to try to avoid consuming foods that contain caffeine, sulfites, high salt content.  I provided her with elimination diet foods today as well as targeted strategies for elimination diet.  I recommend to try for 4 weeks and then follow-up with me.  She will still continue her follow-up with neurology but my hope is that her migraines will be better after controlling diet, environmental and lifestyle factors.    I will see her back in clinic in 4 weeks.  All questions answered to patient satisfaction.             SOTO Quinteros-CNP 03/05/25 8:57 AM

## 2025-03-05 NOTE — PATIENT INSTRUCTIONS
MIGRAINE / HYPERSENSITIVITY DIET        BREAD  Acceptable purchases: Any white, wheat, rye or pumpernickel store-bought bread. Plain or sesame seed bagels, English muffins, quick breads like pumpernickel or zucchini breads. All yeast bread must be 24 hours old.     What to avoid: Fresh baked bread, either homemade or from the grocer's bakery, fresh donuts, fresh breakfast Yi, nut breads, cheese bread, chocolate bread, raisin bread, bagels with dried fruit like blueberry or cranberry bagels. Remember that pizza is fresh bread.     CEREAL   Acceptable purchases: Many cereals are fine. For example: Cheerios, Life, Honey Bunches of Oats, Cracklin' Bran, Frosted Flakes, Frosted Shredded Wheat.     What to avoid: Cereal with nuts, raisins, chocolate, dried fruit, aspartame, peanut butter or coconut.     CRACKERS  Acceptable purchases: Any unflavored cracker such as Saltines, Ritz, Wheat thins, Owens's Table Crackers and Club crackers.     What to avoid: Cheddar cheese crackers, Chick-in-a-bisket, any flavored cracker.     PRETZELS/CHIPS   Acceptable purchases: All plain pretzels and plain potato chips, Tostitos 100% corn chips, Frito's corn chips, Teddy's salt and vinegar chips.     What to avoid: Soft pretzels, honey and mustard pretzels, onion and garlic pretzels or other seasoned pretzels. Avoid Pringles, Doritos Alden chips, jalapeno chips and most other seasoned chips.    PIES/CAKES/COOKIES/CANDY   Acceptable purchases: Blueberry and apple store bought pies if made without lemon juice, vanilla or cinnamon swirl cake, shortbread cookies and vanilla/strawberry wafers, oatmeal cookies without the raisins, rice pudding (no raisins), white chocolate.    What to avoid: Chocolate, chocolate candy, nuts, buttermilk, sour cream, dried fruit (some apricot pies start with dried apricots), peanut butter, lemon extract or lemon juice, almond extract and coconut. Avoid diet and sugar-free products that contain aspartame.      SALAD DRESSING   Acceptable purchases: Any oil and distilled white vinegar. (Homemade ranch is good but you won't find that in the grocery store).     What to avoid: most bottled dressings have one or many of the following; monosodium glutamate, onion or onion powder, grated cheese like Nur or parmesan, natural flavoring, red wine vinegar or balsamic vinegar (or anything other than white).     DIPS/SAUCES   Acceptable purchases: buy ingredients to make your own at home.     What to avoid: dips and sauces usually contain MSG (natural flavoring) or onions. Avoid salsa, chips dips, tomato sauce like Ragu, eduardo or pesto sauce, gravy, mustard dips, barbeque sauce and guacamole (because of the avocados).     MEAT AND MAIN MEALS   Acceptable purchases: Fresh chicken, beef, veal, lamb, fish, turkey or pork. (Some sausage is made without MSG, natural flavor or onion). Be sure the meat is not injected with a tenderizer (like [employer] Simple Tender pork products) or with broth (some turkey and chicken).    What to avoid: Beef liver and chicken liver, marinated meat, ready-made hot wings, barbeque chicken, breaded meat like fried chicken or nuggets or breaded chicken patties, seasoned rotisserie chicken, and any ready-made meal of meat, noodle or rice like burritos, lasagna, Rice-a-Ramiro and Hamburger Faber. Any canned tuna with broth. Anchovies. Spam. Canned soups have MSG and sometimes onions. Avoid nitrites in ham, hot dogs and most lunchmeats.     DAIRY PRODUCTS   Acceptable purchases: Deli American cheese, American cheese with jalapeno peppers, cottage cheese, ricotta cheese and cream cheese. White milk is ok.    What to avoid: Aged cheeses like Cheddar, Avery Frank, Obdulio and Swiss. Avoid mozzarella cheese, Brie, sour cream buttermilk and yogurt. Beware of products made with cheese like pizza and hot pockets. Avoid chocolate milk due to the caffeine.    FRUITS/JUICES   Acceptable purchases: Fresh  strawberries, apples, pears, grapes, peaches, nectarines, blueberries, kiwi, apricots, blackberries, cherries, cantaloupes, mangoes, honeydew melon and watermelon.     What to avoid: Bananas, oranges, grapefruit, rebeca, limes, tangerines, pineapples, Clementines, raspberries, plums, papayas, passion fruit, figs, dates, raisins and avocados. Also avoid dried fruits preserved with sulfites.     VEGETABLES   Acceptable purchases: Preservative-free bagged lettuce like Fresh Express, peppers, zucchini, eggplant, garlic, leeks, spring onions, shallots, potatoes (fresh), some frozen mashed potatoes, broccoli, asparagus, cauliflower, Hornsby' sprouts, carrots, corn, chick peas, mushrooms, canned or frozen peas, yams, string beans, artichokes, red beets, some beans, okra, plain rice, turnips and squash.     What to avoid: Onions, sauerkraut, pea pods, broad Italian beans, lima beans, sergio beans, navy beans and lentils. Also avoid boxed potato flakes, like instant mashed potatoes.     DRINKS   Acceptable purchases: Naturally decaffeinated coffee or tea, caffeine-free herb tea like chamomile, pear juice, apple juice, grape juice, cranberry juice, apricot nectar, caffeine-free Coke/Pepsi, Diet Rite Cola, Waist Watcher Cola/Diet Rootbeer/Diet Black Cherry, Mug Rootbeer, Hires Rootbeer and A&W Rootbeer. Diet soda using sucralose (Splenda) is not a problem. Vodka is the best tolerated alcoholic beverage. White milk is ok.     What to avoid: Coffee, tea, coffee substitutes, hot chocolate, kristofer, orange soda, lemon lime soda, mountain Dew, any diet soda containing aspartame or saccharin, Barq's Rootbeer, (they add caffeine to it), chocolate milk, wine, champagne, beer, heavy alcoholic drinks.     NUTS/SEEDS/POPCORN   Acceptable purchases: Unflavored popcorn that you pop at home, pumpkins seeds, sunflower seeds without natural flavor, sesame seeds and poppy seeds.    What to avoid: Cheddar cheese popcorn, some microwave popcorn,  all nuts and nut butters, including peanuts. Coconut is out as well as almond extract.     SOY PRODUCTS:   Acceptable purchases: Any soy is questionable, so you might want to avoid it altogether until you have achieved headache control. Then try the following products one at a time: soy milk, soy flour, plain tofu and soy oil.     What to avoid: Soy sauce, miso, tempeh, soy burgers, products containing soy protein isolate or concentrate and soy beans.         RECOMMENDED BOOK

## 2025-04-09 ENCOUNTER — APPOINTMENT (OUTPATIENT)
Dept: OTOLARYNGOLOGY | Facility: CLINIC | Age: 35
End: 2025-04-09
Payer: MEDICAID

## 2025-05-21 ENCOUNTER — APPOINTMENT (OUTPATIENT)
Dept: OTOLARYNGOLOGY | Facility: CLINIC | Age: 35
End: 2025-05-21
Payer: MEDICAID

## 2025-06-19 ENCOUNTER — APPOINTMENT (OUTPATIENT)
Dept: NEUROLOGY | Facility: CLINIC | Age: 35
End: 2025-06-19
Payer: MEDICAID